# Patient Record
(demographics unavailable — no encounter records)

---

## 2024-12-18 NOTE — ASSESSMENT
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  Had been feeling well on reduced dose-- plaquenil 400mg TIW and 200mg remainder of days.  Recent ankle swelling unlikely related to SLE, although gout is unlikely given low uric acid, ?cppd.  Will check labs, will obtain ankle x-rays.  Knee joint not affected, however, tenderness (not present currently) appears to have been localized to periarticular structure.  Will obtain x-rays;   will check labs She has a f/u with ENT today  f/u 4-5 m

## 2024-12-18 NOTE — PHYSICAL EXAM
[General Appearance - Alert] : alert [General Appearance - In No Acute Distress] : in no acute distress [General Appearance - Well Nourished] : well nourished [General Appearance - Well-Appearing] : healthy appearing [Extraocular Movements] : extraocular movements were intact [Examination Of The Oral Cavity] : the lips and gums were normal [Nasal Cavity] : the nasal mucosa and septum were normal [Neck Appearance] : the appearance of the neck was normal [Exaggerated Use Of Accessory Muscles For Inspiration] : no accessory muscle use [Auscultation Breath Sounds / Voice Sounds] : lungs were clear to auscultation bilaterally [Heart Rate And Rhythm] : heart rate was normal and rhythm regular [Heart Sounds] : normal S1 and S2 [Heart Sounds Gallop] : no gallops [Murmurs] : no murmurs [Heart Sounds Pericardial Friction Rub] : no pericardial rub [Edema] : there was no peripheral edema [Bowel Sounds] : normal bowel sounds [Abdomen Soft] : soft [Abdomen Tenderness] : non-tender [] : no hepato-splenomegaly [Abdomen Mass (___ Cm)] : no abdominal mass palpated [Cervical Lymph Nodes Enlarged Posterior Bilaterally] : posterior cervical [Cervical Lymph Nodes Enlarged Anterior Bilaterally] : anterior cervical [Supraclavicular Lymph Nodes Enlarged Bilaterally] : supraclavicular [No CVA Tenderness] : no ~M costovertebral angle tenderness [No Spinal Tenderness] : no spinal tenderness [Abnormal Walk] : normal gait [Musculoskeletal - Swelling] : no joint swelling seen [Skin Lesions] : no skin lesions [FreeTextEntry1] : thinned hair on posterior crown +hair growth; [No Focal Deficits] : no focal deficits

## 2024-12-18 NOTE — HISTORY OF PRESENT ILLNESS
[FreeTextEntry1] : Patient is a 62 year old referred by Dr Wilson for opinion regarding a questionable diagnosis of SLE. Patient has a history of childhood allergies/asthma since age 3 and notes that her allergies have been exacerbated seasonally. She received allergy shots in her childhood (age 7) which were stopped in her 20's. Allergy shots resumed about 10 years ago, she has also required intermittent prednisone for treatment of her allergies. She additionally has a history of colitis at age 20 (after a colonoscopy) and was placed on azulfadine, but has been off of it for 36 years, she has additionally been given a diagnosis of IBD. 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 She states that she was on synthroid but has been off of this medication as well for 36 years. She also has a history of psoriasis of the scalp and had been receiving laser therapy until recently (when her dermatologist stated that this was contraindicated in SLE). 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 There is a history of an overnight hospitalization in which she was told she had a renal problem. 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 She notes that the rash on her neck becomes bright red and burns. She has been under much stress since September and has had several outbreaks of a rash on the flexor surface of the forearms, her armpits as well as her upper and lower eyelids. She has received several Medrol dose packs, she has been to [facility] and given ocular steroids, she has been to the ER on several occasions and has been given Medrol dose packs as well as iv corticosteroids and iv Benadryl. She has been followed by Dr Salvador Martinez (rheum); labs have been significant for AYE+ (highest titer seen was 1:160), +DNA (highest value seen was 129), with negative Ro, La, Sm, RNP and normal complement values. She had labs done by [facility] 9/27/18 which again showed a +AYE and DNA; RF, CCP, APL's, and negative CB-CAP C4B and CB-CAP C4d. She has not been given Plaquenil.  Her most pressing symptom is of bilateral conjunctivitis; she has been to the ER, referred to ophthalmology, been given zaditor (ketotifen) eye drops as well as ofoxacin, fluoromeholone drops for the condition. Her concern is that the conjunctivitis is related to her autoimmune lab testing. She notes that she has had patchy hair loss over the past year (primarily seen in the shower and not on the pillow), and that it has increased with the increase of her scalp psoriasis; she has had complaints of diarrhea off and on for a year without change in appetite, nor weight loss. Additionally noted low grade fevers (to 100 F) for several months in late 2017. She complains of fatigue and insomnia as she is stressed about her condition, but is currently sleeping ~5 hours/night. Previously, she slept ~8hrs/night without fatigue. She has had left lateral hip pain for 1 week, no stiffness. She denies nasal or oral ulcers (+cankar sores), no proteinuria (was told of transient renal abnormality during a hospitalization), cytopenias, liver abnormalities. Additionally without Raynauds, serositis, anorexia or weight loss. She does note that sunlight causes the rash to deepen which occurs within minutes of sun exposure. There are multiple allergies to dust and detergents. She feels her mouth is dry (?related to Benadryl), and that in general, she is dry. She has no history of thrombosis or miscarriage.  PMH significant for hyperlipidemia, renal stone PSH: , h/o lithotripsy Allergies: 	penicillin: Drug, Rash 	Petrolatum: Drug, Rash 	Ointment Base: Drug, Rash 	Pepcid: Drug, Rash 	aspirin: Drug, Rash 	Breo Ellipta: Drug, Rash 	hospital sheets/need clean and clear detergent: Miscellaneous, Rash, hospital sheets/need clean and clear detergent 	perfumes and fragrances/rash: Miscellaneous, Rash, perfumes and fragrances/rash 	codeine: Drug, Vomiting, Nausea, Headache 	sulfamethoxazole: Drug, Unknown Meds: atorvastatin 10mg, azelastine spray, Flonase spray, zaditor 0.025% ophthalmic solution, ocflox 0.3% ophthalmic solution, fluorometholone 0.1% ophthalmic suspension, Medrol pack PE: 140/90, 97, 87.5 132 lbs Anxious appearing female with bilateral erythematous conjunctiva with swelling and discharge. +hyperkeratotic lesions in scalp. No oral/nasal ulcerations, no lymphadenopathy, synovitis, joint tenderness. No nodules, livedo, parotid swelling, periungal erythema. Lungs: clear, Cor: RRR w/o rub, murmur, Abd: soft, non-tender BS+, Ext: no CCE, Neuro: NF Imp: 59 year old female with multiple medical issues; currently with conjunctivitis which is likely inflammatory. She is under the care of ophthalmology and allergy for this condition. She has a positive AYE and dsDNA with no other serologic findings, and normal complements. Although she has autoantibodies, she does not fulfill criteria for SLE. Have discussed in detail that her symptoms are not classic for SLE (ie her rash is not consistent, her "renal problem" was transient and resolved without treatment, her left hip pain is not consistent). I have also discussed that it is known that patients with lupus may have a prolonged (up to 9 years) period of autoantibody positivity (but what is not known is if all patients with autoantibodies progress to SLE). Have also discussed that plaquenil may prevent/retard development of clinical disease and have recommend that she initiate the medication. Will f/u 6 weeks; have discussed that she will likely not need to be seen more than twice a year unless there is progression of her disease.  December 10, 2018 f/u ILE/UCTD, now on plaquenil since 10/30. Her conjunctivitis/blepharitis has cleared over the course of 3 weeks with ocular drops as well as allergy treatment-her last Medrol was . She feels much improved with less neck erythema, fatigue improving, less alopecia. She notes that she has gained weight (~5 lbs). She noted one transient episode of left hip pain and a transient episode of neck pain  Debora Galvez								December 10, 2018-cont since her last visit (cervical x-ray performed and she was told of disc disease). She has been started on cozar by her cardiologist () as well as fish oil and calcium/vitamin D. A DEXA scan obtained by her pcp showed osteopenia. She still has fatigue but sleeps ~3-4 hours and has had difficulty falling asleep secondary to "stress". Meds: atorvastatin 10mg, azelastine spray, Flonase spray, cozar 50mg, fishoil 1000mg/300mg bid, Ca/vit D bid PE: 147/80 137.6 Anxious appearing (less than previously noted) female in NAD; ocular lesions resolved; hyperkeratotic scalp lesions, in scalp. No oral/nasal ulcerations, no lymphadenopathy, synovitis, joint tenderness. No nodules, livedo, parotid swelling, periungal erythema. Lungs: clear, Cor: RRR w/o rub, murmur, Abd: soft, non-tender BS+, Ext: no CCE, Neuro: NF Imp 56 year old female with ILE/UCTD on plaquenil (~ 6 weeks), now much improved. Have discussed that she does not meet criteria for SLE, but has autoantibodies; have reinforced rationale for plaquenil (ie stabilization of autoimmunity). Fill check labs; Will f/u 6m She will f/u with cardiology (appointment today) as well as allergy (she receives weekly allergy shots) and dermatology (has been told that he will not resume laser treatment of her scalp hyperkeratosis). She has had her annual flu shot. 2019 f/u ILE/UCTD on plaquenil for almost 9 months. She has been doing well without evidence of progression of her autoimmune disease. Of note, this past May, she did not require any prednisone for her allergies (first time in decades). She has had no joint pain (she has had low back pain and is known to have significant degenerative disease in her lumbar spine), no change in rash (she has intermittent "allergic" rashes), no oral/nasal ulcerations, lymphadenopathy, anorexia, fever, weight loss (she is concerned about her inability to lose weight). She continues with fatigue and has had her thyroid checked, however, she is able to work 9-3p M-F. She experienced bleeding gums-she sees her dentist q 3months and after the last cleaning, there was blood "gushing" from the left upper gums. She was told by the ER that this is secondary to plaquenil. Told last week of prickly heat as a cause of a rash on her lower extremities. Additionally her laser treatments for her scalp psoriasis have been terminated by her dermatologist after learning of ILD. Her medications have been changed since her last visit-she is now on losartan for hypertension and has stopped fishoil secondary to GI intolerance. Of note, her most recent UA had 2+ proteinuria-specific gravity of 1.027. Meds: atorvastatin 10mg, losartan 50mg/d, Benadryl 25mg bid, azelastine spray, Flonase spray, cozar 50mg, fishoil 1000mg/300mg bid, Ca/vit D bid; prn Lilliana Capellan Jody									2019-cont PE: 111/51, 69 139.6 lbs NAD-mildly anxious +hyperkeratotic lesions in scalp. No oral/nasal ulcerations, no lymphadenopathy, synovitis, joint tenderness. No nodules, livedo, parotid swelling, periungal erythema. Lungs: clear, Cor: RRR w/o rub, murmur, Abd: soft, non-tender BS+, Ext: no CCE, Neuro: NF Imp: 56 year old with autoantibodies (AYE, DNA) but no clinical evidence suggestive of SLE. Have reinforced that she is currently stable. Will continue plaquenil; will give 12 week course of vitamin D2 (level checked in  by her PCP and was lower than previous values ~24). As she is concerned about gum bleeding, have told her that she will not be harmed if she does not take plaquenil for ~2 days prior to her next cleaning. Will check Up/c. 2020 f/u ILE, continues to do well. Was told by her cardiologist to lose weight and she has lost ~20 lbs and is ~125. She has been isolating, wearing a mask and frequent hand washing. COVID testing (nasal swab) was done in March and was negative. She denies fever, cough, sob, anosmia, loss of taste. No anorexia, alopecia, rash, scalp sores, joint pains/stiffness/swelling, oral ulcers, lymphadenopathy. She continues on plaquenil. Of note, after vacuuming yesterday, she had left lower back pain which is worse with movement, some relief with acetaminophen.  Meds: plaquenil 400mg, fenofibrate 160mg, atorvastatin 10mg, losartan 50mg/d, Benadryl 25mg bid, azelastine spray, Flonase spray, cozar 50mg, Ca/vit D bid; prn Lilliana Capellan PE: 125/78, 74 123.6 lbs NAD- No rash (no hyperkeratotic lesions in scalp), no oral/nasal ulcerations, no lymphadenopathy, synovitis, joint tenderness. No nodules, livedo, parotid swelling, periungal erythema. Lungs: clear, Cor: RRR w/o rub, murmur, Abd: soft, non-tender BS+, Ext: no CCE, Neuro: NF Imp: 58 year old with ILE/serologic autoimmunity (AYE, DNA) but no clinical evidence suggestive of SLE. She is currently feeling much improved which she attributes to both plaquenil and weight loss. She continues to follow covid guidelines. She has not used sun block secondary to multiple allergic reactions, but uses a hat as well as an umbrella if she is outside. Will continue plaquenil; will give flexeril for muscle sprain of lower back. f/u 4-5 m  2020 f/u ILE, doing well (PtGA=0); was in ER on 3 occasions several months ago secondary to renal stones and a UTI. She is otherwise doing well-she has lost 25 lbs intentionally (her triglycerides were rising and her pcp advised her to lose weight). She has had back pain and an x-ray of her spine revealed degenerative disease (per pt). She had a DEXA recently (will send report) and was told that her bone density is better on the calcium and vitamin D that she has been taking. She has had no fever, cough, anosmia, ageusia, sob. She has psoriasis of her scalp. No alopecia, other rash, ulcers,lymphadenopathy, jointpains/stiffnes/swelling. She does note dry eyes and dry mouth. She sees optho every 6m-last time was this month.  PE: 125//67 130 lbs  No rash (no hyperkeratotic lesions in scalp), no oral/nasal ulcerations, no lymphadenopathy, synovitis, joint tenderness. No nodules, livedo, parotid swelling, periungal erythema.  Lungs: clear, Cor: RRR w/o rub, murmur, Abd: soft, non-tender BS+, Ext: no CCE, Neuro: NF Imp: 58 year old with ILE/serologic autoimmunity (AYE, DNA) continues without clinical evidence or progression of SLE. She is currently feeling much improved which she attributes to both plaquenil and weight loss. 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 Will continue plaquenil; will check labs. f/u 4-5 m  2021 f/u ILE--continues to do well; her mother passed this February (dementia) and she has been grieving. She has maintained her weight loss and continues on the same meds (except for being able to reduce cozar. She states that she has been fatigued, but otherwise has had no fever, rash, anorexia, weight loss, alopecia, lymphadenopathy, chills, joint pain/swelling/stiffness, oral ulcers, dry eyes, chest pain, shortness of breath, cough, anosmia, ageusia, nausea, vomiting, diarrhea, abdominal pain. Her pcp usually checks her thyroid function periodically given her history of Hashimotos (now off synthroid). Her scalp psoriasis has worsened significantly (she is followed by derm and receives creams as well as laser)  2021 f/u ILE, continuing to do well.  Plaquenil was reduced last visit to 400mg TIW with 200mg for 4 days/week.  Since decreasing the medication, she has noticed a red "burning" rash on her neck occurring 1-2x weekly lasting ~1 hour.  She has also had bilateral right>left lateral ankle tenderness for several months--no swelling, no known trauma, no triggering event.  Additionally, she has had low back pain--received an x-ray from her pcp and was told that there were age related changes in her discs.  Otherwise, she has no new symptoms, she remains without fever, anorexia, weight loss (and has been able to maintain her weight), alopecia, lymphadenopathy, chills, joint pain/swelling/stiffness, oral ulcers, fatigue, dry eyes, chest pain, shortness of breath, cough, anosmia, ageusia, nausea, vomiting, diarrhea, abdominal pain. Her previous increased fatigue has improved and likely was secondary to stress (following the death of her mother). PE: 114/66 124 lbs No rash (except for multiple psoriatic lesions in scalp), she has a sharp line of demarcating photoexposed and non-exposed skin on her anterior chest.  No oral/nasal ulcerations, no lymphadenopathy, synovitis, joint tenderness. +posterior/lateral tenderness of left>right ankles without swelling or warmth.  No nodules, livedo, parotid swelling, periungal erythema. Lungs: Clear without wheeze, rales, rhonch; Cor: S1S2 without murmur, gallop or rub; Abd: soft, NT, BS+;, Back: no spinal or paraspinal tenderness; Ext: no CCE; Neuro: non-focal  Imp: 59 year old with ILE/serologic autoimmunity (AYE, DNA) continues without clinical evidence or progression of SLE.  She continues to feel well.  Given minimal and transcient nature of "neck rash" and lack of certainty of its relation to change in plaquenil dose, will continue on reduced dose-- plaquenil to 400mg TIW.  Will give voltaren gel for ankle/tendon tenderness. COVID--Patient received the covid pfizer vaccine on 21 and ; will check titers. She is working at a private school and is concerned regarding her risk. will check labs. f/u 4-5 m  2022  f/u ILE, feels well without fever, rash, anorexia, weight loss (she is trying to lose weight upon advice of her cardiologist), alopecia, lymphadenopathy, chills, joint pain/swelling/stiffness, oral ulcers, fatigue, dry eyes, chest pain, shortness of breath, cough, anosmia, ageusia, nausea, vomiting, diarrhea, abdominal pain. She continues to have scalp flakiness, but has been told that she can not have uv treatment (she is using creams).  She has been taking plaquenil 3days/week @400mg and 200mg/d on other days.  Using voltaren gel on a prn basis. She had blood done by her pcp a few weeks ago-- (no serologies) and hematuria noted.  She does have a remote history of stones.  She had COVID infection (mild, but with symptoms in 2022) PE: 133/76 127 lbs No rash (except for multiple psoriatic lesions in scalp),  No oral/nasal ulcerations, no lymphadenopathy, synovitis, joint tenderness. +posterior/lateral tenderness of left>right ankles without swelling or warmth.  No nodules, livedo, parotid swelling, periungal erythema. Lungs: Clear without wheeze, rales, rhonch; Cor: S1S2 without murmur, gallop or rub; Abd: soft, NT, BS+; Back: no spinal or paraspinal tenderness; Ext: no CCE; Neuro: non-focal  2022 f/u ILE, continues on hydroxychloroquine; sees ophthalmology q 6m.  Since her last encounter, she has been diagnosed with H pylori on endoscopy.  She cannot take the recommended antibiotic treatment as she has allergies to colored medications.  She is communicating with a [facility] pharmacy.  She has additionally developed right trocanteric bursitis (with difficulty laying on that side at night).  She notes erythema of her neck and anterior chest following sun exposure (she is not using spf creams on this area).  Additionally, she has had continued scalp psoriasis as well as female pattern baldness in bilateral temporal regions.  She has also had vertigo occurring at night which has resolved with changing the time that she takes her cozar from the am to pm.  No fever, rash, anorexia, weight loss, alopecia (but hair is not growing in areas of previous loss), lymphadenopathy, chills, joint pain/swelling/stiffness, oral ulcers, fatigue, dry eyes, chest pain, shortness of breath, cough, anosmia, ageusia, nausea, vomiting, diarrhea, abdominal pain.  PE: 128/73/76 132.6 lbs No rash (except for multiple psoriatic lesions in scalp and erythema of neck and upper anterior chest),  No oral/nasal ulcerations, no lymphadenopathy, synovitis, joint tenderness. + right trocanteric bursa tenderness.  No nodules, livedo, parotid swelling, periungal erythema. Lungs: Clear without wheeze, rales, rhonch; Cor: S1S2 without murmur, gallop or rub; Abd: soft, NT, BS+; Back: no spinal or paraspinal tenderness; Ext: no CCE; Neuro: non-focal  Apr 10, 2023 f/u ILE, doing well since last visit-- has had 2 UTI's since ~Nov; saw a urologist in November w a normal cystoscopy (she has a follow-up in July); she has a gyn exam today. She notes about 2 weeks ago that her left foot and 1st toe became swollen lasting ~1week (took tylenol) w resolution, she did note that her sheet on the foot was sensitive.. Uric acid was checked by pcp (result pending).  Also with peeling of palms which is resolving (she had used a new hairspray as well as a new bathtub ) Continues w psoriasis in her scalp.    Oct 2, 2023 f/u ILE- continuing to experience UTI's (told of an overactive bladder); and she was started on a new medication;  she saw a neurolgist (had spells of dizziness) who ordered a MRI (was told it was normal) and advised to take her bp meds at night.  No reoccurrence of toe/foot pain (there had been a question of gout).  Of note the peeling of her palm is thought to be secondary to chlorox wipes.  The scalp psoriasis has continued (she uses cordran lotion, but hasn't been receiving laser treatment).-- her hair has been growing.. She hasn't been using artificial tears.  No fever, anorexia, weight loss, alopecia, lymphadenopathy, chills, joint pain/swelling/stiffness, oral ulcers, fatigue, , chest pain, shortness of breath, cough, anosmia, ageusia, nausea, vomiting, diarrhea, abdominal pain.  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  No fever, rash,  alopecia, lymphadenopathy, chills, joint pain/swelling/stiffness, oral ulcers, fatigue, dry eyes, chest pain, shortness of breath, cough, nausea, vomiting, diarrhea, abdominal pain.   Dec 17, 2024 f/u ILE-- since last visit has had thinning of her hair on crown noticed by herself as well as dermatology--?attribution to stress.  She has additionally had fatigue and several UTI's-- received treatment with cipro, last receipt was 2 months ago.  Continues with overactive bladder, interment hematuria.   On , she developed sudden onset of right ankle swelling without triggering event.  She went to [facility], and was given a 6 day course of steroids which she completed on .  She has seen her pcp, a Uric acid was obtained (~3) as well as an ESR (nl).  She has noted right knee lateral and posterior tenderness since , no swelling nor stiffness. No triggering event, no trauma, infection.  Additionally without rash, diarrhea, sore throat, constitutional symptoms. She has had an external right ear infection, ?Eustatian tube) and will see ENT later today. Fenofibrate increased by pcp. Of note, at last visit she had relayed that she had received the covid pfizer vaccine on 21 and ;;and had subsequently received 2 boosters  Her pcp has recommended against receiving further vaccination, we discussed at last visit that rates were currently low, but that she should strongly consider receiving it in the future (and have pcp call me)  Meds: plaquenil 400mg (MWF 200mg) (2024 optho), fenofibrate , atorvastatin 10mg, losartan 25mg/d,  bid, azelastine spray, Flonase spray, cozar 25mg, Ca/vit D bid; prn Zaditor, Cordan (for psoriasis); off benadryl

## 2024-12-18 NOTE — HISTORY OF PRESENT ILLNESS
[FreeTextEntry1] : Patient is a 62 year old referred by Dr Wilson for opinion regarding a questionable diagnosis of SLE. Patient has a history of childhood allergies/asthma since age 3 and notes that her allergies have been exacerbated seasonally. She received allergy shots in her childhood (age 7) which were stopped in her 20's. Allergy shots resumed about 10 years ago, she has also required intermittent prednisone for treatment of her allergies. She additionally has a history of colitis at age 20 (after a colonoscopy) and was placed on azulfadine, but has been off of it for 36 years, she has additionally been given a diagnosis of IBD. 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 She states that she was on synthroid but has been off of this medication as well for 36 years. She also has a history of psoriasis of the scalp and had been receiving laser therapy until recently (when her dermatologist stated that this was contraindicated in SLE). 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 There is a history of an overnight hospitalization in which she was told she had a renal problem. 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 She notes that the rash on her neck becomes bright red and burns. She has been under much stress since September and has had several outbreaks of a rash on the flexor surface of the forearms, her armpits as well as her upper and lower eyelids. She has received several Medrol dose packs, she has been to [facility] and given ocular steroids, she has been to the ER on several occasions and has been given Medrol dose packs as well as iv corticosteroids and iv Benadryl. She has been followed by Dr Salvador Martinez (rheum); labs have been significant for AYE+ (highest titer seen was 1:160), +DNA (highest value seen was 129), with negative Ro, La, Sm, RNP and normal complement values. She had labs done by [facility] 9/27/18 which again showed a +AYE and DNA; RF, CCP, APL's, and negative CB-CAP C4B and CB-CAP C4d. She has not been given Plaquenil.  Her most pressing symptom is of bilateral conjunctivitis; she has been to the ER, referred to ophthalmology, been given zaditor (ketotifen) eye drops as well as ofoxacin, fluoromeholone drops for the condition. Her concern is that the conjunctivitis is related to her autoimmune lab testing. She notes that she has had patchy hair loss over the past year (primarily seen in the shower and not on the pillow), and that it has increased with the increase of her scalp psoriasis; she has had complaints of diarrhea off and on for a year without change in appetite, nor weight loss. Additionally noted low grade fevers (to 100 F) for several months in late 2017. She complains of fatigue and insomnia as she is stressed about her condition, but is currently sleeping ~5 hours/night. Previously, she slept ~8hrs/night without fatigue. She has had left lateral hip pain for 1 week, no stiffness. She denies nasal or oral ulcers (+cankar sores), no proteinuria (was told of transient renal abnormality during a hospitalization), cytopenias, liver abnormalities. Additionally without Raynauds, serositis, anorexia or weight loss. She does note that sunlight causes the rash to deepen which occurs within minutes of sun exposure. There are multiple allergies to dust and detergents. She feels her mouth is dry (?related to Benadryl), and that in general, she is dry. She has no history of thrombosis or miscarriage.  PMH significant for hyperlipidemia, renal stone PSH: , h/o lithotripsy Allergies: 	penicillin: Drug, Rash 	Petrolatum: Drug, Rash 	Ointment Base: Drug, Rash 	Pepcid: Drug, Rash 	aspirin: Drug, Rash 	Breo Ellipta: Drug, Rash 	hospital sheets/need clean and clear detergent: Miscellaneous, Rash, hospital sheets/need clean and clear detergent 	perfumes and fragrances/rash: Miscellaneous, Rash, perfumes and fragrances/rash 	codeine: Drug, Vomiting, Nausea, Headache 	sulfamethoxazole: Drug, Unknown Meds: atorvastatin 10mg, azelastine spray, Flonase spray, zaditor 0.025% ophthalmic solution, ocflox 0.3% ophthalmic solution, fluorometholone 0.1% ophthalmic suspension, Medrol pack PE: 140/90, 97, 87.5 132 lbs Anxious appearing female with bilateral erythematous conjunctiva with swelling and discharge. +hyperkeratotic lesions in scalp. No oral/nasal ulcerations, no lymphadenopathy, synovitis, joint tenderness. No nodules, livedo, parotid swelling, periungal erythema. Lungs: clear, Cor: RRR w/o rub, murmur, Abd: soft, non-tender BS+, Ext: no CCE, Neuro: NF Imp: 59 year old female with multiple medical issues; currently with conjunctivitis which is likely inflammatory. She is under the care of ophthalmology and allergy for this condition. She has a positive AYE and dsDNA with no other serologic findings, and normal complements. Although she has autoantibodies, she does not fulfill criteria for SLE. Have discussed in detail that her symptoms are not classic for SLE (ie her rash is not consistent, her "renal problem" was transient and resolved without treatment, her left hip pain is not consistent). I have also discussed that it is known that patients with lupus may have a prolonged (up to 9 years) period of autoantibody positivity (but what is not known is if all patients with autoantibodies progress to SLE). Have also discussed that plaquenil may prevent/retard development of clinical disease and have recommend that she initiate the medication. Will f/u 6 weeks; have discussed that she will likely not need to be seen more than twice a year unless there is progression of her disease.  December 10, 2018 f/u ILE/UCTD, now on plaquenil since 10/30. Her conjunctivitis/blepharitis has cleared over the course of 3 weeks with ocular drops as well as allergy treatment-her last Medrol was . She feels much improved with less neck erythema, fatigue improving, less alopecia. She notes that she has gained weight (~5 lbs). She noted one transient episode of left hip pain and a transient episode of neck pain  Debora Galvez								December 10, 2018-cont since her last visit (cervical x-ray performed and she was told of disc disease). She has been started on cozar by her cardiologist () as well as fish oil and calcium/vitamin D. A DEXA scan obtained by her pcp showed osteopenia. She still has fatigue but sleeps ~3-4 hours and has had difficulty falling asleep secondary to "stress". Meds: atorvastatin 10mg, azelastine spray, Flonase spray, cozar 50mg, fishoil 1000mg/300mg bid, Ca/vit D bid PE: 147/80 137.6 Anxious appearing (less than previously noted) female in NAD; ocular lesions resolved; hyperkeratotic scalp lesions, in scalp. No oral/nasal ulcerations, no lymphadenopathy, synovitis, joint tenderness. No nodules, livedo, parotid swelling, periungal erythema. Lungs: clear, Cor: RRR w/o rub, murmur, Abd: soft, non-tender BS+, Ext: no CCE, Neuro: NF Imp 56 year old female with ILE/UCTD on plaquenil (~ 6 weeks), now much improved. Have discussed that she does not meet criteria for SLE, but has autoantibodies; have reinforced rationale for plaquenil (ie stabilization of autoimmunity). Fill check labs; Will f/u 6m She will f/u with cardiology (appointment today) as well as allergy (she receives weekly allergy shots) and dermatology (has been told that he will not resume laser treatment of her scalp hyperkeratosis). She has had her annual flu shot. 2019 f/u ILE/UCTD on plaquenil for almost 9 months. She has been doing well without evidence of progression of her autoimmune disease. Of note, this past May, she did not require any prednisone for her allergies (first time in decades). She has had no joint pain (she has had low back pain and is known to have significant degenerative disease in her lumbar spine), no change in rash (she has intermittent "allergic" rashes), no oral/nasal ulcerations, lymphadenopathy, anorexia, fever, weight loss (she is concerned about her inability to lose weight). She continues with fatigue and has had her thyroid checked, however, she is able to work 9-3p M-F. She experienced bleeding gums-she sees her dentist q 3months and after the last cleaning, there was blood "gushing" from the left upper gums. She was told by the ER that this is secondary to plaquenil. Told last week of prickly heat as a cause of a rash on her lower extremities. Additionally her laser treatments for her scalp psoriasis have been terminated by her dermatologist after learning of ILD. Her medications have been changed since her last visit-she is now on losartan for hypertension and has stopped fishoil secondary to GI intolerance. Of note, her most recent UA had 2+ proteinuria-specific gravity of 1.027. Meds: atorvastatin 10mg, losartan 50mg/d, Benadryl 25mg bid, azelastine spray, Flonase spray, cozar 50mg, fishoil 1000mg/300mg bid, Ca/vit D bid; prn Lilliana Capellan Jody									2019-cont PE: 111/51, 69 139.6 lbs NAD-mildly anxious +hyperkeratotic lesions in scalp. No oral/nasal ulcerations, no lymphadenopathy, synovitis, joint tenderness. No nodules, livedo, parotid swelling, periungal erythema. Lungs: clear, Cor: RRR w/o rub, murmur, Abd: soft, non-tender BS+, Ext: no CCE, Neuro: NF Imp: 56 year old with autoantibodies (AYE, DNA) but no clinical evidence suggestive of SLE. Have reinforced that she is currently stable. Will continue plaquenil; will give 12 week course of vitamin D2 (level checked in  by her PCP and was lower than previous values ~24). As she is concerned about gum bleeding, have told her that she will not be harmed if she does not take plaquenil for ~2 days prior to her next cleaning. Will check Up/c. 2020 f/u ILE, continues to do well. Was told by her cardiologist to lose weight and she has lost ~20 lbs and is ~125. She has been isolating, wearing a mask and frequent hand washing. COVID testing (nasal swab) was done in March and was negative. She denies fever, cough, sob, anosmia, loss of taste. No anorexia, alopecia, rash, scalp sores, joint pains/stiffness/swelling, oral ulcers, lymphadenopathy. She continues on plaquenil. Of note, after vacuuming yesterday, she had left lower back pain which is worse with movement, some relief with acetaminophen.  Meds: plaquenil 400mg, fenofibrate 160mg, atorvastatin 10mg, losartan 50mg/d, Benadryl 25mg bid, azelastine spray, Flonase spray, cozar 50mg, Ca/vit D bid; prn Lilliana Capellan PE: 125/78, 74 123.6 lbs NAD- No rash (no hyperkeratotic lesions in scalp), no oral/nasal ulcerations, no lymphadenopathy, synovitis, joint tenderness. No nodules, livedo, parotid swelling, periungal erythema. Lungs: clear, Cor: RRR w/o rub, murmur, Abd: soft, non-tender BS+, Ext: no CCE, Neuro: NF Imp: 58 year old with ILE/serologic autoimmunity (AYE, DNA) but no clinical evidence suggestive of SLE. She is currently feeling much improved which she attributes to both plaquenil and weight loss. She continues to follow covid guidelines. She has not used sun block secondary to multiple allergic reactions, but uses a hat as well as an umbrella if she is outside. Will continue plaquenil; will give flexeril for muscle sprain of lower back. f/u 4-5 m  2020 f/u ILE, doing well (PtGA=0); was in ER on 3 occasions several months ago secondary to renal stones and a UTI. She is otherwise doing well-she has lost 25 lbs intentionally (her triglycerides were rising and her pcp advised her to lose weight). She has had back pain and an x-ray of her spine revealed degenerative disease (per pt). She had a DEXA recently (will send report) and was told that her bone density is better on the calcium and vitamin D that she has been taking. She has had no fever, cough, anosmia, ageusia, sob. She has psoriasis of her scalp. No alopecia, other rash, ulcers,lymphadenopathy, jointpains/stiffnes/swelling. She does note dry eyes and dry mouth. She sees optho every 6m-last time was this month.  PE: 125//67 130 lbs  No rash (no hyperkeratotic lesions in scalp), no oral/nasal ulcerations, no lymphadenopathy, synovitis, joint tenderness. No nodules, livedo, parotid swelling, periungal erythema.  Lungs: clear, Cor: RRR w/o rub, murmur, Abd: soft, non-tender BS+, Ext: no CCE, Neuro: NF Imp: 58 year old with ILE/serologic autoimmunity (AYE, DNA) continues without clinical evidence or progression of SLE. She is currently feeling much improved which she attributes to both plaquenil and weight loss. 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 Will continue plaquenil; will check labs. f/u 4-5 m  2021 f/u ILE--continues to do well; her mother passed this February (dementia) and she has been grieving. She has maintained her weight loss and continues on the same meds (except for being able to reduce cozar. She states that she has been fatigued, but otherwise has had no fever, rash, anorexia, weight loss, alopecia, lymphadenopathy, chills, joint pain/swelling/stiffness, oral ulcers, dry eyes, chest pain, shortness of breath, cough, anosmia, ageusia, nausea, vomiting, diarrhea, abdominal pain. Her pcp usually checks her thyroid function periodically given her history of Hashimotos (now off synthroid). Her scalp psoriasis has worsened significantly (she is followed by derm and receives creams as well as laser)  2021 f/u ILE, continuing to do well.  Plaquenil was reduced last visit to 400mg TIW with 200mg for 4 days/week.  Since decreasing the medication, she has noticed a red "burning" rash on her neck occurring 1-2x weekly lasting ~1 hour.  She has also had bilateral right>left lateral ankle tenderness for several months--no swelling, no known trauma, no triggering event.  Additionally, she has had low back pain--received an x-ray from her pcp and was told that there were age related changes in her discs.  Otherwise, she has no new symptoms, she remains without fever, anorexia, weight loss (and has been able to maintain her weight), alopecia, lymphadenopathy, chills, joint pain/swelling/stiffness, oral ulcers, fatigue, dry eyes, chest pain, shortness of breath, cough, anosmia, ageusia, nausea, vomiting, diarrhea, abdominal pain. Her previous increased fatigue has improved and likely was secondary to stress (following the death of her mother). PE: 114/66 124 lbs No rash (except for multiple psoriatic lesions in scalp), she has a sharp line of demarcating photoexposed and non-exposed skin on her anterior chest.  No oral/nasal ulcerations, no lymphadenopathy, synovitis, joint tenderness. +posterior/lateral tenderness of left>right ankles without swelling or warmth.  No nodules, livedo, parotid swelling, periungal erythema. Lungs: Clear without wheeze, rales, rhonch; Cor: S1S2 without murmur, gallop or rub; Abd: soft, NT, BS+;, Back: no spinal or paraspinal tenderness; Ext: no CCE; Neuro: non-focal  Imp: 59 year old with ILE/serologic autoimmunity (AYE, DNA) continues without clinical evidence or progression of SLE.  She continues to feel well.  Given minimal and transcient nature of "neck rash" and lack of certainty of its relation to change in plaquenil dose, will continue on reduced dose-- plaquenil to 400mg TIW.  Will give voltaren gel for ankle/tendon tenderness. COVID--Patient received the covid pfizer vaccine on 21 and ; will check titers. She is working at a private school and is concerned regarding her risk. will check labs. f/u 4-5 m  2022  f/u ILE, feels well without fever, rash, anorexia, weight loss (she is trying to lose weight upon advice of her cardiologist), alopecia, lymphadenopathy, chills, joint pain/swelling/stiffness, oral ulcers, fatigue, dry eyes, chest pain, shortness of breath, cough, anosmia, ageusia, nausea, vomiting, diarrhea, abdominal pain. She continues to have scalp flakiness, but has been told that she can not have uv treatment (she is using creams).  She has been taking plaquenil 3days/week @400mg and 200mg/d on other days.  Using voltaren gel on a prn basis. She had blood done by her pcp a few weeks ago-- (no serologies) and hematuria noted.  She does have a remote history of stones.  She had COVID infection (mild, but with symptoms in 2022) PE: 133/76 127 lbs No rash (except for multiple psoriatic lesions in scalp),  No oral/nasal ulcerations, no lymphadenopathy, synovitis, joint tenderness. +posterior/lateral tenderness of left>right ankles without swelling or warmth.  No nodules, livedo, parotid swelling, periungal erythema. Lungs: Clear without wheeze, rales, rhonch; Cor: S1S2 without murmur, gallop or rub; Abd: soft, NT, BS+; Back: no spinal or paraspinal tenderness; Ext: no CCE; Neuro: non-focal  2022 f/u ILE, continues on hydroxychloroquine; sees ophthalmology q 6m.  Since her last encounter, she has been diagnosed with H pylori on endoscopy.  She cannot take the recommended antibiotic treatment as she has allergies to colored medications.  She is communicating with a [facility] pharmacy.  She has additionally developed right trocanteric bursitis (with difficulty laying on that side at night).  She notes erythema of her neck and anterior chest following sun exposure (she is not using spf creams on this area).  Additionally, she has had continued scalp psoriasis as well as female pattern baldness in bilateral temporal regions.  She has also had vertigo occurring at night which has resolved with changing the time that she takes her cozar from the am to pm.  No fever, rash, anorexia, weight loss, alopecia (but hair is not growing in areas of previous loss), lymphadenopathy, chills, joint pain/swelling/stiffness, oral ulcers, fatigue, dry eyes, chest pain, shortness of breath, cough, anosmia, ageusia, nausea, vomiting, diarrhea, abdominal pain.  PE: 128/73/76 132.6 lbs No rash (except for multiple psoriatic lesions in scalp and erythema of neck and upper anterior chest),  No oral/nasal ulcerations, no lymphadenopathy, synovitis, joint tenderness. + right trocanteric bursa tenderness.  No nodules, livedo, parotid swelling, periungal erythema. Lungs: Clear without wheeze, rales, rhonch; Cor: S1S2 without murmur, gallop or rub; Abd: soft, NT, BS+; Back: no spinal or paraspinal tenderness; Ext: no CCE; Neuro: non-focal  Apr 10, 2023 f/u ILE, doing well since last visit-- has had 2 UTI's since ~Nov; saw a urologist in November w a normal cystoscopy (she has a follow-up in July); she has a gyn exam today. She notes about 2 weeks ago that her left foot and 1st toe became swollen lasting ~1week (took tylenol) w resolution, she did note that her sheet on the foot was sensitive.. Uric acid was checked by pcp (result pending).  Also with peeling of palms which is resolving (she had used a new hairspray as well as a new bathtub ) Continues w psoriasis in her scalp.    Oct 2, 2023 f/u ILE- continuing to experience UTI's (told of an overactive bladder); and she was started on a new medication;  she saw a neurolgist (had spells of dizziness) who ordered a MRI (was told it was normal) and advised to take her bp meds at night.  No reoccurrence of toe/foot pain (there had been a question of gout).  Of note the peeling of her palm is thought to be secondary to chlorox wipes.  The scalp psoriasis has continued (she uses cordran lotion, but hasn't been receiving laser treatment).-- her hair has been growing.. She hasn't been using artificial tears.  No fever, anorexia, weight loss, alopecia, lymphadenopathy, chills, joint pain/swelling/stiffness, oral ulcers, fatigue, , chest pain, shortness of breath, cough, anosmia, ageusia, nausea, vomiting, diarrhea, abdominal pain.  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  No fever, rash,  alopecia, lymphadenopathy, chills, joint pain/swelling/stiffness, oral ulcers, fatigue, dry eyes, chest pain, shortness of breath, cough, nausea, vomiting, diarrhea, abdominal pain.   Dec 17, 2024 f/u ILE-- since last visit has had thinning of her hair on crown noticed by herself as well as dermatology--?attribution to stress.  She has additionally had fatigue and several UTI's-- received treatment with cipro, last receipt was 2 months ago.  Continues with overactive bladder, interment hematuria.   On , she developed sudden onset of right ankle swelling without triggering event.  She went to [facility], and was given a 6 day course of steroids which she completed on .  She has seen her pcp, a Uric acid was obtained (~3) as well as an ESR (nl).  She has noted right knee lateral and posterior tenderness since , no swelling nor stiffness. No triggering event, no trauma, infection.  Additionally without rash, diarrhea, sore throat, constitutional symptoms. She has had an external right ear infection, ?Eustatian tube) and will see ENT later today. Fenofibrate increased by pcp. Of note, at last visit she had relayed that she had received the covid pfizer vaccine on 21 and ;;and had subsequently received 2 boosters  Her pcp has recommended against receiving further vaccination, we discussed at last visit that rates were currently low, but that she should strongly consider receiving it in the future (and have pcp call me)  Meds: plaquenil 400mg (MWF 200mg) (2024 optho), fenofibrate , atorvastatin 10mg, losartan 25mg/d,  bid, azelastine spray, Flonase spray, cozar 25mg, Ca/vit D bid; prn Zaditor, Cordan (for psoriasis); off benadryl

## 2025-06-23 NOTE — HISTORY OF PRESENT ILLNESS
[FreeTextEntry1] : Patient is a 63 year old referred by Dr Wilson for opinion regarding a questionable diagnosis of SLE. Patient has a history of childhood allergies/asthma since age 3 and notes that her allergies have been exacerbated seasonally. She received allergy shots in her childhood (age 7) which were stopped in her 20's. Allergy shots resumed about 10 years ago, she has also required intermittent prednisone for treatment of her allergies. She additionally has a history of colitis at age 20 (after a colonoscopy) and was placed on azulfadine, but has been off of it for 36 years, she has additionally been given a diagnosis of IBD. 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 She states that she was on synthroid but has been off of this medication as well for 36 years. She also has a history of psoriasis of the scalp and had been receiving laser therapy until recently (when her dermatologist stated that this was contraindicated in SLE). 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 There is a history of an overnight hospitalization in which she was told she had a renal problem. 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 She notes that the rash on her neck becomes bright red and burns. She has been under much stress since September and has had several outbreaks of a rash on the flexor surface of the forearms, her armpits as well as her upper and lower eyelids. She has received several Medrol dose packs, she has been to [facility] and given ocular steroids, she has been to the ER on several occasions and has been given Medrol dose packs as well as iv corticosteroids and iv Benadryl. She has been followed by Dr Salvador Martinez (rheum); labs have been significant for AYE+ (highest titer seen was 1:160), +DNA (highest value seen was 129), with negative Ro, La, Sm, RNP and normal complement values. She had labs done by [facility] 9/27/18 which again showed a +AYE and DNA; RF, CCP, APL's, and negative CB-CAP C4B and CB-CAP C4d. She has not been given Plaquenil.  Her most pressing symptom is of bilateral conjunctivitis; she has been to the ER, referred to ophthalmology, been given zaditor (ketotifen) eye drops as well as ofoxacin, fluoromeholone drops for the condition. Her concern is that the conjunctivitis is related to her autoimmune lab testing. She notes that she has had patchy hair loss over the past year (primarily seen in the shower and not on the pillow), and that it has increased with the increase of her scalp psoriasis; she has had complaints of diarrhea off and on for a year without change in appetite, nor weight loss. Additionally noted low grade fevers (to 100 F) for several months in late 2017. She complains of fatigue and insomnia as she is stressed about her condition, but is currently sleeping ~5 hours/night. Previously, she slept ~8hrs/night without fatigue. She has had left lateral hip pain for 1 week, no stiffness. She denies nasal or oral ulcers (+cankar sores), no proteinuria (was told of transient renal abnormality during a hospitalization), cytopenias, liver abnormalities. Additionally without Raynauds, serositis, anorexia or weight loss. She does note that sunlight causes the rash to deepen which occurs within minutes of sun exposure. There are multiple allergies to dust and detergents. She feels her mouth is dry (?related to Benadryl), and that in general, she is dry. She has no history of thrombosis or miscarriage.  PMH significant for hyperlipidemia, renal stone PSH: , h/o lithotripsy Allergies: 	penicillin: Drug, Rash 	Petrolatum: Drug, Rash 	Ointment Base: Drug, Rash 	Pepcid: Drug, Rash 	aspirin: Drug, Rash 	Breo Ellipta: Drug, Rash 	hospital sheets/need clean and clear detergent: Miscellaneous, Rash, hospital sheets/need clean and clear detergent 	perfumes and fragrances/rash: Miscellaneous, Rash, perfumes and fragrances/rash 	codeine: Drug, Vomiting, Nausea, Headache 	sulfamethoxazole: Drug, Unknown Meds: atorvastatin 10mg, azelastine spray, Flonase spray, zaditor 0.025% ophthalmic solution, ocflox 0.3% ophthalmic solution, fluorometholone 0.1% ophthalmic suspension, Medrol pack PE: 140/90, 97, 87.5 132 lbs Anxious appearing female with bilateral erythematous conjunctiva with swelling and discharge. +hyperkeratotic lesions in scalp. No oral/nasal ulcerations, no lymphadenopathy, synovitis, joint tenderness. No nodules, livedo, parotid swelling, periungal erythema. Lungs: clear, Cor: RRR w/o rub, murmur, Abd: soft, non-tender BS+, Ext: no CCE, Neuro: NF Imp: 59 year old female with multiple medical issues; currently with conjunctivitis which is likely inflammatory. She is under the care of ophthalmology and allergy for this condition. She has a positive AYE and dsDNA with no other serologic findings, and normal complements. Although she has autoantibodies, she does not fulfill criteria for SLE. Have discussed in detail that her symptoms are not classic for SLE (ie her rash is not consistent, her "renal problem" was transient and resolved without treatment, her left hip pain is not consistent). I have also discussed that it is known that patients with lupus may have a prolonged (up to 9 years) period of autoantibody positivity (but what is not known is if all patients with autoantibodies progress to SLE). Have also discussed that plaquenil may prevent/retard development of clinical disease and have recommend that she initiate the medication. Will f/u 6 weeks; have discussed that she will likely not need to be seen more than twice a year unless there is progression of her disease.  December 10, 2018 f/u ILE/UCTD, now on plaquenil since 10/30. Her conjunctivitis/blepharitis has cleared over the course of 3 weeks with ocular drops as well as allergy treatment-her last Medrol was . She feels much improved with less neck erythema, fatigue improving, less alopecia. She notes that she has gained weight (~5 lbs). She noted one transient episode of left hip pain and a transient episode of neck pain  Debora Galvez								December 10, 2018-cont since her last visit (cervical x-ray performed and she was told of disc disease). She has been started on cozar by her cardiologist () as well as fish oil and calcium/vitamin D. A DEXA scan obtained by her pcp showed osteopenia. She still has fatigue but sleeps ~3-4 hours and has had difficulty falling asleep secondary to "stress". Meds: atorvastatin 10mg, azelastine spray, Flonase spray, cozar 50mg, fishoil 1000mg/300mg bid, Ca/vit D bid PE: 147/80 137.6 Anxious appearing (less than previously noted) female in NAD; ocular lesions resolved; hyperkeratotic scalp lesions, in scalp. No oral/nasal ulcerations, no lymphadenopathy, synovitis, joint tenderness. No nodules, livedo, parotid swelling, periungal erythema. Lungs: clear, Cor: RRR w/o rub, murmur, Abd: soft, non-tender BS+, Ext: no CCE, Neuro: NF Imp 56 year old female with ILE/UCTD on plaquenil (~ 6 weeks), now much improved. Have discussed that she does not meet criteria for SLE, but has autoantibodies; have reinforced rationale for plaquenil (ie stabilization of autoimmunity). Fill check labs; Will f/u 6m She will f/u with cardiology (appointment today) as well as allergy (she receives weekly allergy shots) and dermatology (has been told that he will not resume laser treatment of her scalp hyperkeratosis). She has had her annual flu shot. 2019 f/u ILE/UCTD on plaquenil for almost 9 months. She has been doing well without evidence of progression of her autoimmune disease. Of note, this past May, she did not require any prednisone for her allergies (first time in decades). She has had no joint pain (she has had low back pain and is known to have significant degenerative disease in her lumbar spine), no change in rash (she has intermittent "allergic" rashes), no oral/nasal ulcerations, lymphadenopathy, anorexia, fever, weight loss (she is concerned about her inability to lose weight). She continues with fatigue and has had her thyroid checked, however, she is able to work 9-3p M-F. She experienced bleeding gums-she sees her dentist q 3months and after the last cleaning, there was blood "gushing" from the left upper gums. She was told by the ER that this is secondary to plaquenil. Told last week of prickly heat as a cause of a rash on her lower extremities. Additionally her laser treatments for her scalp psoriasis have been terminated by her dermatologist after learning of ILD. Her medications have been changed since her last visit-she is now on losartan for hypertension and has stopped fishoil secondary to GI intolerance. Of note, her most recent UA had 2+ proteinuria-specific gravity of 1.027. Meds: atorvastatin 10mg, losartan 50mg/d, Benadryl 25mg bid, azelastine spray, Flonase spray, cozar 50mg, fishoil 1000mg/300mg bid, Ca/vit D bid; prn Lilliana Capellan Jody									2019-cont PE: 111/51, 69 139.6 lbs NAD-mildly anxious +hyperkeratotic lesions in scalp. No oral/nasal ulcerations, no lymphadenopathy, synovitis, joint tenderness. No nodules, livedo, parotid swelling, periungal erythema. Lungs: clear, Cor: RRR w/o rub, murmur, Abd: soft, non-tender BS+, Ext: no CCE, Neuro: NF Imp: 56 year old with autoantibodies (AYE, DNA) but no clinical evidence suggestive of SLE. Have reinforced that she is currently stable. Will continue plaquenil; will give 12 week course of vitamin D2 (level checked in  by her PCP and was lower than previous values ~24). As she is concerned about gum bleeding, have told her that she will not be harmed if she does not take plaquenil for ~2 days prior to her next cleaning. Will check Up/c. 2020 f/u ILE, continues to do well. Was told by her cardiologist to lose weight and she has lost ~20 lbs and is ~125. She has been isolating, wearing a mask and frequent hand washing. COVID testing (nasal swab) was done in March and was negative. She denies fever, cough, sob, anosmia, loss of taste. No anorexia, alopecia, rash, scalp sores, joint pains/stiffness/swelling, oral ulcers, lymphadenopathy. She continues on plaquenil. Of note, after vacuuming yesterday, she had left lower back pain which is worse with movement, some relief with acetaminophen.  Meds: plaquenil 400mg, fenofibrate 160mg, atorvastatin 10mg, losartan 50mg/d, Benadryl 25mg bid, azelastine spray, Flonase spray, cozar 50mg, Ca/vit D bid; prn Lilliana Capellan PE: 125/78, 74 123.6 lbs NAD- No rash (no hyperkeratotic lesions in scalp), no oral/nasal ulcerations, no lymphadenopathy, synovitis, joint tenderness. No nodules, livedo, parotid swelling, periungal erythema. Lungs: clear, Cor: RRR w/o rub, murmur, Abd: soft, non-tender BS+, Ext: no CCE, Neuro: NF Imp: 58 year old with ILE/serologic autoimmunity (AYE, DNA) but no clinical evidence suggestive of SLE. She is currently feeling much improved which she attributes to both plaquenil and weight loss. She continues to follow covid guidelines. She has not used sun block secondary to multiple allergic reactions, but uses a hat as well as an umbrella if she is outside. Will continue plaquenil; will give flexeril for muscle sprain of lower back. f/u 4-5 m  2020 f/u ILE, doing well (PtGA=0); was in ER on 3 occasions several months ago secondary to renal stones and a UTI. She is otherwise doing well-she has lost 25 lbs intentionally (her triglycerides were rising and her pcp advised her to lose weight). She has had back pain and an x-ray of her spine revealed degenerative disease (per pt). She had a DEXA recently (will send report) and was told that her bone density is better on the calcium and vitamin D that she has been taking. She has had no fever, cough, anosmia, ageusia, sob. She has psoriasis of her scalp. No alopecia, other rash, ulcers,lymphadenopathy, jointpains/stiffnes/swelling. She does note dry eyes and dry mouth. She sees optho every 6m-last time was this month.  PE: 125//67 130 lbs  No rash (no hyperkeratotic lesions in scalp), no oral/nasal ulcerations, no lymphadenopathy, synovitis, joint tenderness. No nodules, livedo, parotid swelling, periungal erythema.  Lungs: clear, Cor: RRR w/o rub, murmur, Abd: soft, non-tender BS+, Ext: no CCE, Neuro: NF Imp: 58 year old with ILE/serologic autoimmunity (AYE, DNA) continues without clinical evidence or progression of SLE. She is currently feeling much improved which she attributes to both plaquenil and weight loss. 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 Will continue plaquenil; will check labs. f/u 4-5 m  2021 f/u ILE--continues to do well; her mother passed this February (dementia) and she has been grieving. She has maintained her weight loss and continues on the same meds (except for being able to reduce cozar. She states that she has been fatigued, but otherwise has had no fever, rash, anorexia, weight loss, alopecia, lymphadenopathy, chills, joint pain/swelling/stiffness, oral ulcers, dry eyes, chest pain, shortness of breath, cough, anosmia, ageusia, nausea, vomiting, diarrhea, abdominal pain. Her pcp usually checks her thyroid function periodically given her history of Hashimotos (now off synthroid). Her scalp psoriasis has worsened significantly (she is followed by derm and receives creams as well as laser)  2021 f/u ILE, continuing to do well.  Plaquenil was reduced last visit to 400mg TIW with 200mg for 4 days/week.  Since decreasing the medication, she has noticed a red "burning" rash on her neck occurring 1-2x weekly lasting ~1 hour.  She has also had bilateral right>left lateral ankle tenderness for several months--no swelling, no known trauma, no triggering event.  Additionally, she has had low back pain--received an x-ray from her pcp and was told that there were age related changes in her discs.  Otherwise, she has no new symptoms, she remains without fever, anorexia, weight loss (and has been able to maintain her weight), alopecia, lymphadenopathy, chills, joint pain/swelling/stiffness, oral ulcers, fatigue, dry eyes, chest pain, shortness of breath, cough, anosmia, ageusia, nausea, vomiting, diarrhea, abdominal pain. Her previous increased fatigue has improved and likely was secondary to stress (following the death of her mother). PE: 114/66 124 lbs No rash (except for multiple psoriatic lesions in scalp), she has a sharp line of demarcating photoexposed and non-exposed skin on her anterior chest.  No oral/nasal ulcerations, no lymphadenopathy, synovitis, joint tenderness. +posterior/lateral tenderness of left>right ankles without swelling or warmth.  No nodules, livedo, parotid swelling, periungal erythema. Lungs: Clear without wheeze, rales, rhonch; Cor: S1S2 without murmur, gallop or rub; Abd: soft, NT, BS+;, Back: no spinal or paraspinal tenderness; Ext: no CCE; Neuro: non-focal  Imp: 59 year old with ILE/serologic autoimmunity (AYE, DNA) continues without clinical evidence or progression of SLE.  She continues to feel well.  Given minimal and transcient nature of "neck rash" and lack of certainty of its relation to change in plaquenil dose, will continue on reduced dose-- plaquenil to 400mg TIW.  Will give voltaren gel for ankle/tendon tenderness. COVID--Patient received the covid pfizer vaccine on 21 and ; will check titers. She is working at a private school and is concerned regarding her risk. will check labs. f/u 4-5 m  2022  f/u ILE, feels well without fever, rash, anorexia, weight loss (she is trying to lose weight upon advice of her cardiologist), alopecia, lymphadenopathy, chills, joint pain/swelling/stiffness, oral ulcers, fatigue, dry eyes, chest pain, shortness of breath, cough, anosmia, ageusia, nausea, vomiting, diarrhea, abdominal pain. She continues to have scalp flakiness, but has been told that she can not have uv treatment (she is using creams).  She has been taking plaquenil 3days/week @400mg and 200mg/d on other days.  Using voltaren gel on a prn basis. She had blood done by her pcp a few weeks ago-- (no serologies) and hematuria noted.  She does have a remote history of stones.  She had COVID infection (mild, but with symptoms in 2022) PE: 133/76 127 lbs No rash (except for multiple psoriatic lesions in scalp),  No oral/nasal ulcerations, no lymphadenopathy, synovitis, joint tenderness. +posterior/lateral tenderness of left>right ankles without swelling or warmth.  No nodules, livedo, parotid swelling, periungal erythema. Lungs: Clear without wheeze, rales, rhonch; Cor: S1S2 without murmur, gallop or rub; Abd: soft, NT, BS+; Back: no spinal or paraspinal tenderness; Ext: no CCE; Neuro: non-focal  2022 f/u ILE, continues on hydroxychloroquine; sees ophthalmology q 6m.  Since her last encounter, she has been diagnosed with H pylori on endoscopy.  She cannot take the recommended antibiotic treatment as she has allergies to colored medications.  She is communicating with a [facility] pharmacy.  She has additionally developed right trocanteric bursitis (with difficulty laying on that side at night).  She notes erythema of her neck and anterior chest following sun exposure (she is not using spf creams on this area).  Additionally, she has had continued scalp psoriasis as well as female pattern baldness in bilateral temporal regions.  She has also had vertigo occurring at night which has resolved with changing the time that she takes her cozar from the am to pm.  No fever, rash, anorexia, weight loss, alopecia (but hair is not growing in areas of previous loss), lymphadenopathy, chills, joint pain/swelling/stiffness, oral ulcers, fatigue, dry eyes, chest pain, shortness of breath, cough, anosmia, ageusia, nausea, vomiting, diarrhea, abdominal pain.  PE: 128/73/76 132.6 lbs No rash (except for multiple psoriatic lesions in scalp and erythema of neck and upper anterior chest),  No oral/nasal ulcerations, no lymphadenopathy, synovitis, joint tenderness. + right trocanteric bursa tenderness.  No nodules, livedo, parotid swelling, periungal erythema. Lungs: Clear without wheeze, rales, rhonch; Cor: S1S2 without murmur, gallop or rub; Abd: soft, NT, BS+; Back: no spinal or paraspinal tenderness; Ext: no CCE; Neuro: non-focal  Apr 10, 2023 f/u ILE, doing well since last visit-- has had 2 UTI's since ~Nov; saw a urologist in November w a normal cystoscopy (she has a follow-up in July); she has a gyn exam today. She notes about 2 weeks ago that her left foot and 1st toe became swollen lasting ~1week (took tylenol) w resolution, she did note that her sheet on the foot was sensitive.. Uric acid was checked by pcp (result pending).  Also with peeling of palms which is resolving (she had used a new hairspray as well as a new bathtub ) Continues w psoriasis in her scalp.    Oct 2, 2023 f/u ILE- continuing to experience UTI's (told of an overactive bladder); and she was started on a new medication;  she saw a neurolgist (had spells of dizziness) who ordered a MRI (was told it was normal) and advised to take her bp meds at night.  No reoccurrence of toe/foot pain (there had been a question of gout).  Of note the peeling of her palm is thought to be secondary to chlorox wipes.  The scalp psoriasis has continued (she uses cordran lotion, but hasn't been receiving laser treatment).-- her hair has been growing.. She hasn't been using artificial tears.  No fever, anorexia, weight loss, alopecia, lymphadenopathy, chills, joint pain/swelling/stiffness, oral ulcers, fatigue, , chest pain, shortness of breath, cough, anosmia, ageusia, nausea, vomiting, diarrhea, abdominal pain.  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  No fever, rash,  alopecia, lymphadenopathy, chills, joint pain/swelling/stiffness, oral ulcers, fatigue, dry eyes, chest pain, shortness of breath, cough, nausea, vomiting, diarrhea, abdominal pain.   Dec 17, 2024 f/u ILE-- since last visit has had thinning of her hair on crown noticed by herself as well as dermatology--?attribution to stress.  She has additionally had fatigue and several UTI's-- received treatment with cipro, last receipt was 2 months ago.  Continues with overactive bladder, interment hematuria.   On , she developed sudden onset of right ankle swelling without triggering event.  She went to [facility], and was given a 6 day course of steroids which she completed on .  She has seen her pcp, a Uric acid was obtained (~3) as well as an ESR (nl).  She has noted right knee lateral and posterior tenderness since , no swelling nor stiffness. No triggering event, no trauma, infection.  Additionally without rash, diarrhea, sore throat, constitutional symptoms. She has had an external right ear infection, ?Eustatian tube) and will see ENT later today. Fenofibrate increased by pcp. Of note, at last visit she had relayed that she had received the covid pfizer vaccine on 21 and ;;and had subsequently received 2 boosters  Her pcp has recommended against receiving further vaccination, we discussed at last visit that rates were currently low, but that she should strongly consider receiving it in the future (and have pcp call me)  2025 f/u ILE-- she has been feeling well except for continuation of persistent requirement for antibiotics for inner ear "infections".  No fever, rash, anorexia, weight loss, alopecia, lymphadenopathy, chills, joint pain/swelling/stiffness, oral ulcers, fatigue, dry eyes, chest pain, shortness of breath, cough, headache, edema, nausea, vomiting, diarrhea, abdominal pain.  She was to have Eustacian tube dilation, ([facility]), however, ENT informed her that the issue may not be narrowed canals (she had been told this many years ago-- her history is chronic), but may be secondary to "lupus".   Other than pain and decreased hearing with the infections (experienced up to 5-6 months a year and occurring 1-3x/month), there are no other associated symptoms such as tinnitus, dizzyness or vertigo.  Meds: plaquenil 400mg (MWF 200mg) (2025 optho), fenofibrate , atorvastatin 10mg, d, norvasc 2.5mgd, azelastine spray, Flonase spray, Ca/vit D bid; prn Zaditor, Cordan (for psoriasis); off benadryl , losartan 25mg/

## 2025-06-23 NOTE — ASSESSMENT
[FreeTextEntry1] : Imp: 63 year old with ILE/serologic autoimmunity (AYE, DNA-- now negative). Continues to feeli well on reduced dose-- plaquenil 400mg TIW and 200mg remainder of days.  She has an MRI of her ears scheduled and a follow-up with ENT in July.  Her ENT surgeon will call me (would consider emperic trial of corticosteroids, however, unclear if indicated given chronicity of condition.  Will check labs and conduct an extensive work-up including ANCA, aPL  and HSP70 antibodies.